# Patient Record
Sex: MALE | Race: OTHER | NOT HISPANIC OR LATINO | URBAN - METROPOLITAN AREA
[De-identification: names, ages, dates, MRNs, and addresses within clinical notes are randomized per-mention and may not be internally consistent; named-entity substitution may affect disease eponyms.]

---

## 2017-01-04 ENCOUNTER — EMERGENCY (EMERGENCY)
Facility: HOSPITAL | Age: 22
LOS: 1 days | Discharge: HOME | End: 2017-01-04
Attending: EMERGENCY MEDICINE
Payer: SELF-PAY

## 2017-01-04 VITALS
SYSTOLIC BLOOD PRESSURE: 107 MMHG | TEMPERATURE: 98 F | WEIGHT: 156.09 LBS | DIASTOLIC BLOOD PRESSURE: 72 MMHG | HEIGHT: 70 IN | RESPIRATION RATE: 18 BRPM | HEART RATE: 90 BPM | OXYGEN SATURATION: 100 %

## 2017-01-04 VITALS
DIASTOLIC BLOOD PRESSURE: 79 MMHG | SYSTOLIC BLOOD PRESSURE: 106 MMHG | HEART RATE: 87 BPM | OXYGEN SATURATION: 99 % | RESPIRATION RATE: 17 BRPM | TEMPERATURE: 99 F

## 2017-01-04 LAB
ANION GAP SERPL CALC-SCNC: 7 MMOL/L — SIGNIFICANT CHANGE UP (ref 5–17)
BUN SERPL-MCNC: 8 MG/DL — SIGNIFICANT CHANGE UP (ref 7–18)
CALCIUM SERPL-MCNC: 9.1 MG/DL — SIGNIFICANT CHANGE UP (ref 8.4–10.5)
CHLORIDE SERPL-SCNC: 100 MMOL/L — SIGNIFICANT CHANGE UP (ref 96–108)
CO2 SERPL-SCNC: 27 MMOL/L — SIGNIFICANT CHANGE UP (ref 22–31)
CREAT SERPL-MCNC: 0.84 MG/DL — SIGNIFICANT CHANGE UP (ref 0.5–1.3)
GLUCOSE SERPL-MCNC: 98 MG/DL — SIGNIFICANT CHANGE UP (ref 70–99)
HCT VFR BLD CALC: 43.3 % — SIGNIFICANT CHANGE UP (ref 39–50)
HGB BLD-MCNC: 13.4 G/DL — SIGNIFICANT CHANGE UP (ref 13–17)
LYMPHOCYTES # BLD AUTO: 10 % — LOW (ref 13–44)
MCHC RBC-ENTMCNC: 20.4 PG — LOW (ref 27–34)
MCHC RBC-ENTMCNC: 31 GM/DL — LOW (ref 32–36)
MCV RBC AUTO: 66.1 FL — LOW (ref 80–100)
MONOCYTES NFR BLD AUTO: 7 % — SIGNIFICANT CHANGE UP (ref 2–14)
NEUTROPHILS NFR BLD AUTO: 76 % — SIGNIFICANT CHANGE UP (ref 43–77)
PLATELET # BLD AUTO: 387 K/UL — SIGNIFICANT CHANGE UP (ref 150–400)
POTASSIUM SERPL-MCNC: 4.8 MMOL/L — SIGNIFICANT CHANGE UP (ref 3.5–5.3)
POTASSIUM SERPL-SCNC: 4.8 MMOL/L — SIGNIFICANT CHANGE UP (ref 3.5–5.3)
RBC # BLD: 6.55 M/UL — HIGH (ref 4.2–5.8)
RBC # FLD: 12.3 % — SIGNIFICANT CHANGE UP (ref 10.3–14.5)
SODIUM SERPL-SCNC: 134 MMOL/L — LOW (ref 135–145)
WBC # BLD: 18.1 K/UL — HIGH (ref 3.8–10.5)
WBC # FLD AUTO: 18.1 K/UL — HIGH (ref 3.8–10.5)

## 2017-01-04 PROCEDURE — 74177 CT ABD & PELVIS W/CONTRAST: CPT

## 2017-01-04 PROCEDURE — 85027 COMPLETE CBC AUTOMATED: CPT

## 2017-01-04 PROCEDURE — 99284 EMERGENCY DEPT VISIT MOD MDM: CPT | Mod: 25

## 2017-01-04 PROCEDURE — 80048 BASIC METABOLIC PNL TOTAL CA: CPT

## 2017-01-04 PROCEDURE — 74177 CT ABD & PELVIS W/CONTRAST: CPT | Mod: 26

## 2017-01-04 PROCEDURE — 99285 EMERGENCY DEPT VISIT HI MDM: CPT

## 2017-01-04 RX ORDER — MOXIFLOXACIN HYDROCHLORIDE TABLETS, 400 MG 400 MG/1
1 TABLET, FILM COATED ORAL
Qty: 6 | Refills: 0 | OUTPATIENT
Start: 2017-01-04 | End: 2017-01-07

## 2017-01-04 RX ORDER — SODIUM CHLORIDE 9 MG/ML
1000 INJECTION INTRAMUSCULAR; INTRAVENOUS; SUBCUTANEOUS ONCE
Qty: 0 | Refills: 0 | Status: COMPLETED | OUTPATIENT
Start: 2017-01-04 | End: 2017-01-04

## 2017-01-04 RX ADMIN — SODIUM CHLORIDE 3000 MILLILITER(S): 9 INJECTION INTRAMUSCULAR; INTRAVENOUS; SUBCUTANEOUS at 19:14

## 2017-01-04 NOTE — ED PROVIDER NOTE - CARE PLAN
Principal Discharge DX:	Abdominal pain  Secondary Diagnosis:	Diarrhea Principal Discharge DX:	Colitis  Secondary Diagnosis:	Diarrhea

## 2017-01-04 NOTE — ED PROVIDER NOTE - OBJECTIVE STATEMENT
22 yo male with diffuse abdominal discomfort and states diarrhea intermittent x 1 month. No vomiting.

## 2017-01-04 NOTE — ED ADULT NURSE NOTE - OBJECTIVE STATEMENT
Pt states that he has been having lower abd pain for the past 1 month, with diarrhea. lower back pain for the past month. Mom by patient states that they went to the urgent care yesterday and was called in today for el;evated white WBC. 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

## 2017-01-07 DIAGNOSIS — K52.9 NONINFECTIVE GASTROENTERITIS AND COLITIS, UNSPECIFIED: ICD-10-CM

## 2017-01-07 DIAGNOSIS — M10.9 GOUT, UNSPECIFIED: ICD-10-CM

## 2022-08-04 NOTE — ED PROVIDER NOTE - CPE EDP [facility] NORM
Pharmacy sent a form stating that medication prescribed is for bi weekly patches and the directions states once weekly. Please resend correct [facility] to [facility]. normal...
